# Patient Record
Sex: MALE | Race: WHITE | Employment: OTHER | URBAN - METROPOLITAN AREA
[De-identification: names, ages, dates, MRNs, and addresses within clinical notes are randomized per-mention and may not be internally consistent; named-entity substitution may affect disease eponyms.]

---

## 2022-08-23 ENCOUNTER — TELEPHONE (OUTPATIENT)
Dept: CARDIOLOGY CLINIC | Age: 82
End: 2022-08-23

## 2022-08-23 NOTE — TELEPHONE ENCOUNTER
[facility]  Cardiovascular & Thoracic Surgery  Structural Heart Program  [facility] [address]  [id number] (z) [phone] (q) [phone]    Watchman Clinic Evaluation       Eddy Wyatt has need for anticoagulation and is considered a high risk for stroke, but has a relative contraindication for long term anticoagulation. Pt. Reports two strokes in 2000 and a mechanical fall in 4/22 resulting in ER evaluation. Most recently, pt. had a stroke 6/22 while on Eliquis with left sided weakness and subsequently uses a walker for ambulation. Eliquis is currently held but would be ok to restart if necessary, four months after the stroke, for the short-term 45-day period following watchman device implantation per. Dr. Laurie Roach and neurologist, William Leyva DO from [facility] (See Dr. Marita Flores note from 8/12/22 for further details regarding this conversation). Plan for watchman procedure at the end of October per Dr. Laurie Roach. Pt. Deemed unable to take long term oral anticoagulation due to history of History of bleeding and/or non-major bleeding, High propensity for bleeding based on high-risk HAS-BLED score , and High propensity for bleeding based on history of falls . Based on the patient's medical history of non-valvular atrial fibrillation, stroke risk, as shown with [id number] score of 6, and bleeding risk, as shown with HAS-BLED score of 4, a shared decision was made between the patient and Dr. Ness Velasco to consider pursuing closure of the left atrial appendage as a safe and effective alternative to long term oral anticoagulation.      Atrial Fibrillation CHADSVASC2 Score Stroke Risk:     [de-identified] y.o. > 76        +4    male Male     +0   CHF HX: No    + 0   HTN HX: Yes    +1   Stroke/TIA/Thromboembolism Yes   +2   Vascular Disease HX: Yes    +1   Diabetes Mellitus No    + 0   CHADSVASC 2 Score 6      Annual Stroke Risk 9.7%- moderate-high                HAS-BLED Score:    HTN Hx [x] 1 Point   Renal Disease  [] 1 Point   Liver Disease [] [address] Hx [x] 1 Point   Prior Major Bleeding or Predisposition [x] 1 Point   Labile INR [] 1 Point   Age > 72 Years Current: [de-identified] y.o. [x] 1 Point   Rx Usage Predisposing to Bleeding [] 1 Point   Alcohol Use (> or = 8 Drinks/wk) [] 1 Point   Total: UCHASBLED: Score 4 High Risk 8.9% Risk of major bleeding 8.70  Bleeds Per 100 pts years Alternatives to Anticoagulation can be considered       Phone call planned for next week to discuss scheduling watchman procedure date in October, 4 months after stroke in June 2022. Thank you for the opportunity to participate in the care of your patient.       Ettie Simmonds, RN  08/23/22    [facility]  Structural Heart Nurse Coordinator

## 2022-09-01 ENCOUNTER — TELEPHONE (OUTPATIENT)
Dept: CARDIOLOGY CLINIC | Age: 82
End: 2022-09-01

## 2022-09-01 DIAGNOSIS — I48.0 PAROXYSMAL ATRIAL FIBRILLATION (HCC): Primary | ICD-10-CM

## 2022-09-01 NOTE — TELEPHONE ENCOUNTER
[facility]  Cardiovascular & Thoracic Surgery  Structural Heart Program  [facility] [address]  [address]   Y) [phone]  (L) [phone]     Master Pre-Procedure Instructions     Procedure: Master  Date: 10/24/22  Time: 10:30 AM   Provider: Derek Kennedy MD   Arrival Time: 09:30 AM   [facility] [address] [place] [address]     Before Your Procedure  ? If you develop flu-like symptoms (body aches, cough, fever, headache), cold, sore throat, flu, fever, infection,test positive for COVID-19 within 10 daysof your procedure, or wake up ill the morning of your procedure, please call [phone] and notify a Cardiac Procedural Nurse. ? Do not eat, drink, or chew gum after 12:00 AM (midnight) the night before your procedure. ? Your doctor may ask you to stop, change how you take, or start new medications before your procedure. Any pre-procedure medication changes will be reviewed during a pre-procedure phone call a week prior to your procedure. ? Please prepare to stay overnight in the hospital.   ? Please have your pre-procedure labs drawn at a [facility] facility on 10/20/22 (See the [facility] Lab information [facility] for lab locations and hours). ? Take a shower or bathe the night before or the morning of your procedure. You have additional testing to complete prior to your procedure. Please refer to lab locations below to have your labs completed 72 hours before your procedure. [facility] Laboratory Locations    Sites open Monday to Friday. ? indicates the location is open Saturdays. Call your preferred location for test preparation, business hours and other information you need. ? [facility] at [facility] [facility] [address]. [place] [address]    [phone]  Monday - Friday,  7:00 a.m. - 5:00 p.m.    Saturday,   No appointment needed  [facility] [address], [address]  [phone]  Monday-Friday, 6:30 a.m. - 6:00 p.m. No appointment needed     ? [facility] \[facility]\"" Outpatient Laboratory  [address], [address]  Monday - Friday, 7:00 a.m. - 6:00 p.m. Saturday, 9:00 a.m. - noon    [address]  [address] [facility] [address] [facility]  [phone]  Monday-Friday, 7:00 a.m. - 6:00  p.m. No appointment needed    ? [facility], [address] [facility] [address]  [phone]  Monday - Friday, 7:00 a.m. - 3:30 p.m. Saturday, 7:00 a.m. to noon  No appointment needed        The Day of Your Procedure   ? Please arrive at 09:30 AM (arrival time) on 10/24/22 (procedure date). You will report to the Heart & Vascular Center Entrance (off [address] [place] [address] Road [facility]) located in the back [facility] ([address], [address]). You will check in with the  (to the right of the sliding doors) and wait in the waiting area until you are greeted by a Cardiac Procedural Nurse and escorted back to the pre-procedural area. Call [phone] for directions. ? We make every effort to keep your scheduled time. In case of an unforeseen emergency, there is a chance your procedure could be delayed. We will inform you on arrival and /or as soon as a procedure delaying emergency occurs, if this is the case. Thank you for your patience and understanding. ? Bring an overnight bag and arrange for a friend or family member to drive you to and take you home from the hospital. Most patients stay in the hospital after their procedure and leave the hospital the next morning. ? The morning of your procedure, take your medications/pre-procedural medications as directed, with a sip of water. ? Patient and visitor parking is located directly in front of the [facility] entrance.    ? Wear loose, comfortable clothing appropriate for the procedure. ? Bring an updated medication list (or bring all medications in a clear plastic bag to review with Cardiac Procedural Nurse), copy of your Advance Directive (if you have one), insurance/prescription cards, and photo ID. Leave other valuables at home. ? No makeup, hair spray, perfume, jewelry, or contact lens. If you wear dentures or glasses, please bring a storage container so they can be safely removed prior to your procedure. ? If you are diabetic, please check and record your blood sugar prior to leaving for your procedure. ? Female patients may be asked for a urine specimen upon arrival, if under the age of 48years old. ? If you use a CPAP at night for sleep apnea, bring the CPAP machine with you to the hospital for your procedure and notify your doctor. ? You are allowed one visitor to be present with you in the pre-procedural area (This rule is subject to change based on national CDC guidelines and hospital regulations). No children under the age of 15 are allowed in the pre-procedural area. After Your Procedure   ? You will stay in the hospital for a minimum of one night. Thisis subject to change based on your unique needs and procedure, in efforts to keep you safe. ? After your procedure, you will return to the cardiac pre-procedure area. During this time, you will remain flat and your nurse will help to keep you safe and comfortable. After several hours, your nurse will get you something to eat and drink. Then, you will be transported to the Cardiac Intensive Care Unit ([phone]) or Cardiovascular Stepdown Unit ([phone]) where you will rest and spend the night. ? At discharge, the nurse will review your instructions, medications, follow-up appointments with you.  ? You may resume normal activities 48 hours after discharge BUT do not lift more than 10 lbs and avoid vigorous physical activityfor 7 days.   ? You may shower 24 hours after discharge BUT avoid hot tubs, saunas, pools, and bathsfor 7 days. Keep the site covered with a clean bandage/band aid and avoid lotion, Vaseline, or scrubbing. ? You will need to take antibiotics for the first 6 months after your procedure if you have dental work or procedures. ? Once you return home, call 911 if you experience: shortness of breathnot relieved by rest, chest pain, fever > 101 º F, and swelling/bleeding at your site. ? If you take metformin, you may resume this medication 48 hours after your discharge. If you are prescribed a blood thinning medication, it is important that you take this medication every day as prescribed. Call your Structural Heart Team with any questions [phone] (8:30AM-4:30PM M-F).

## 2022-10-19 ENCOUNTER — TRANSCRIBE ORDER (OUTPATIENT)
Dept: REGISTRATION | Age: 82
End: 2022-10-19

## 2022-10-19 ENCOUNTER — HOSPITAL ENCOUNTER (OUTPATIENT)
Dept: PREADMISSION TESTING | Age: 82
Discharge: HOME | End: 2022-10-19
Payer: MEDICARE

## 2022-10-19 DIAGNOSIS — I48.0 PAROXYSMAL ATRIAL FIBRILLATION (HCC): ICD-10-CM

## 2022-10-19 DIAGNOSIS — I48.0 PAROXYSMAL ATRIAL FIBRILLATION (HCC): Primary | ICD-10-CM

## 2022-10-19 PROBLEM — K21.9 GASTROESOPHAGEAL REFLUX DISEASE WITHOUT ESOPHAGITIS: Status: ACTIVE | Noted: 2022-10-19

## 2022-10-19 PROBLEM — E78.2 MIXED HYPERLIPIDEMIA: Status: ACTIVE | Noted: 2022-10-19

## 2022-10-19 PROBLEM — R56.9 SEIZURES (HCC): Status: ACTIVE | Noted: 2022-10-19

## 2022-10-19 PROBLEM — I49.5 SICK SINUS SYNDROME (HCC): Status: ACTIVE | Noted: 2022-10-19

## 2022-10-19 PROBLEM — I61.0 NONTRAUMATIC SUBCORTICAL HEMORRHAGE OF RIGHT CEREBRAL HEMISPHERE (HCC): Status: ACTIVE | Noted: 2022-10-19

## 2022-10-19 PROBLEM — S22.060A COMPRESSION FRACTURE OF T7 VERTEBRA (HCC): Status: ACTIVE | Noted: 2021-11-15

## 2022-10-19 PROBLEM — Z96.89 STATUS POST DEEP BRAIN STIMULATOR PLACEMENT: Status: ACTIVE | Noted: 2022-10-19

## 2022-10-19 PROBLEM — I25.10 ASCVD (ARTERIOSCLEROTIC CARDIOVASCULAR DISEASE): Status: ACTIVE | Noted: 2022-10-19

## 2022-10-19 PROBLEM — I10 BENIGN ESSENTIAL HTN: Status: ACTIVE | Noted: 2022-10-19

## 2022-10-19 PROBLEM — G90.3 NEUROGENIC ORTHOSTATIC HYPOTENSION (HCC): Status: ACTIVE | Noted: 2022-10-19

## 2022-10-19 PROBLEM — G20 PARKINSONISM (HCC): Status: ACTIVE | Noted: 2022-10-19

## 2022-10-19 PROBLEM — Z95.9 UNSPECIFIED CARDIAC DEVICE IN SITU: Status: ACTIVE | Noted: 2022-10-19

## 2022-10-19 PROBLEM — Z86.73 HISTORY OF CVA (CEREBROVASCULAR ACCIDENT): Status: ACTIVE | Noted: 2022-10-19

## 2022-10-19 PROBLEM — I44.7 LBBB (LEFT BUNDLE BRANCH BLOCK): Status: ACTIVE | Noted: 2022-10-19

## 2022-10-19 PROBLEM — R25.9 MIXED ACTION AND RESTING TREMOR: Status: ACTIVE | Noted: 2021-08-03

## 2022-10-19 PROBLEM — N18.9 CKD (CHRONIC KIDNEY DISEASE): Status: ACTIVE | Noted: 2022-10-19

## 2022-10-19 LAB
ABO + RH BLD: NORMAL
ANION GAP SERPL CALC-SCNC: 5 MMOL/L (ref 3–18)
ATRIAL RATE: 66 BPM
BLOOD GROUP ANTIBODIES SERPL: NORMAL
BUN SERPL-MCNC: 23 MG/DL (ref 7–18)
BUN/CREAT SERPL: 15 (ref 12–20)
CALCIUM SERPL-MCNC: 8.3 MG/DL (ref 8.5–10.1)
CALCULATED R AXIS, ECG10: 12 DEGREES
CALCULATED T AXIS, ECG11: 77 DEGREES
CHLORIDE SERPL-SCNC: 106 MMOL/L (ref 100–111)
CO2 SERPL-SCNC: 29 MMOL/L (ref 21–32)
CREAT SERPL-MCNC: 1.53 MG/DL (ref 0.6–1.3)
DIAGNOSIS, 93000: NORMAL
ERYTHROCYTE [DISTWIDTH] IN BLOOD BY AUTOMATED COUNT: 13.2 % (ref 11.6–14.5)
GLUCOSE SERPL-MCNC: 140 MG/DL (ref 74–99)
HCT VFR BLD AUTO: 36 % (ref 36–48)
HGB BLD-MCNC: 12 G/DL (ref 13–16)
INR PPP: 1.1 (ref 0.8–1.2)
MCH RBC QN AUTO: 29.1 PG (ref 24–34)
MCHC RBC AUTO-ENTMCNC: 33.3 G/DL (ref 31–37)
MCV RBC AUTO: 87.4 FL (ref 78–100)
NRBC # BLD: 0 K/UL (ref 0–0.01)
NRBC BLD-RTO: 0 PER 100 WBC
P-R INTERVAL, ECG05: 280 MS
PLATELET # BLD AUTO: 195 K/UL (ref 135–420)
PMV BLD AUTO: 9.9 FL (ref 9.2–11.8)
POTASSIUM SERPL-SCNC: 4 MMOL/L (ref 3.5–5.5)
PROTHROMBIN TIME: 14.7 SEC (ref 11.5–15.2)
Q-T INTERVAL, ECG07: 446 MS
QRS DURATION, ECG06: 120 MS
QTC CALCULATION (BEZET), ECG08: 467 MS
RBC # BLD AUTO: 4.12 M/UL (ref 4.35–5.65)
SODIUM SERPL-SCNC: 140 MMOL/L (ref 136–145)
SPECIMEN EXP DATE BLD: NORMAL
VENTRICULAR RATE, ECG03: 66 BPM
WBC # BLD AUTO: 7.2 K/UL (ref 4.6–13.2)

## 2022-10-19 PROCEDURE — 36415 COLL VENOUS BLD VENIPUNCTURE: CPT

## 2022-10-19 PROCEDURE — 80048 BASIC METABOLIC PNL TOTAL CA: CPT

## 2022-10-19 PROCEDURE — 85610 PROTHROMBIN TIME: CPT

## 2022-10-19 PROCEDURE — 86900 BLOOD TYPING SEROLOGIC ABO: CPT

## 2022-10-19 PROCEDURE — 93005 ELECTROCARDIOGRAM TRACING: CPT

## 2022-10-19 PROCEDURE — 85027 COMPLETE CBC AUTOMATED: CPT

## 2022-10-24 ENCOUNTER — HOSPITAL ENCOUNTER (INPATIENT)
Age: 82
LOS: 1 days | Discharge: HOME | DRG: 274 | End: 2022-10-24
Attending: INTERNAL MEDICINE | Admitting: INTERNAL MEDICINE
Payer: MEDICARE

## 2022-10-24 ENCOUNTER — APPOINTMENT (OUTPATIENT)
Dept: GENERAL RADIOLOGY | Age: 82
DRG: 274 | End: 2022-10-24
Attending: INTERNAL MEDICINE
Payer: MEDICARE

## 2022-10-24 ENCOUNTER — ANESTHESIA (OUTPATIENT)
Dept: CARDIAC CATH/INVASIVE PROCEDURES | Age: 82
DRG: 274 | End: 2022-10-24
Payer: MEDICARE

## 2022-10-24 ENCOUNTER — TRANSCRIBE ORDER (OUTPATIENT)
Dept: CARDIAC CATH/INVASIVE PROCEDURES | Age: 82
End: 2022-10-24

## 2022-10-24 ENCOUNTER — ANESTHESIA EVENT (OUTPATIENT)
Dept: CARDIAC CATH/INVASIVE PROCEDURES | Age: 82
DRG: 274 | End: 2022-10-24
Payer: MEDICARE

## 2022-10-24 VITALS
OXYGEN SATURATION: 93 % | DIASTOLIC BLOOD PRESSURE: 94 MMHG | WEIGHT: 145 LBS | RESPIRATION RATE: 14 BRPM | HEART RATE: 72 BPM | SYSTOLIC BLOOD PRESSURE: 127 MMHG | BODY MASS INDEX: 20.81 KG/M2

## 2022-10-24 DIAGNOSIS — I48.0 PAROXYSMAL ATRIAL FIBRILLATION (HCC): ICD-10-CM

## 2022-10-24 DIAGNOSIS — Z95.818 PRESENCE OF WATCHMAN LEFT ATRIAL APPENDAGE CLOSURE DEVICE: Primary | ICD-10-CM

## 2022-10-24 DIAGNOSIS — I48.0 PAROXYSMAL ATRIAL FIBRILLATION (HCC): Primary | ICD-10-CM

## 2022-10-24 PROBLEM — I48.91 ATRIAL FIBRILLATION (HCC): Status: ACTIVE | Noted: 2022-10-24

## 2022-10-24 LAB
ACT BLD: 190 SECS (ref 79–138)
ACT BLD: 207 SECS (ref 79–138)
ANION GAP SERPL CALC-SCNC: 5 MMOL/L (ref 3–18)
ATRIAL RATE: 74 BPM
BASOPHILS # BLD: 0 K/UL (ref 0–0.1)
BASOPHILS NFR BLD: 1 % (ref 0–2)
BUN SERPL-MCNC: 19 MG/DL (ref 7–18)
BUN/CREAT SERPL: 14 (ref 12–20)
CALCIUM SERPL-MCNC: 8.7 MG/DL (ref 8.5–10.1)
CALCULATED P AXIS, ECG09: 80 DEGREES
CALCULATED R AXIS, ECG10: -18 DEGREES
CALCULATED T AXIS, ECG11: 27 DEGREES
CHLORIDE SERPL-SCNC: 106 MMOL/L (ref 100–111)
CO2 SERPL-SCNC: 28 MMOL/L (ref 21–32)
CREAT SERPL-MCNC: 1.33 MG/DL (ref 0.6–1.3)
DIAGNOSIS, 93000: NORMAL
DIFFERENTIAL METHOD BLD: ABNORMAL
EOSINOPHIL # BLD: 0.8 K/UL (ref 0–0.4)
EOSINOPHIL NFR BLD: 10 % (ref 0–5)
ERYTHROCYTE [DISTWIDTH] IN BLOOD BY AUTOMATED COUNT: 12.9 % (ref 11.6–14.5)
GLUCOSE SERPL-MCNC: 122 MG/DL (ref 74–99)
HCT VFR BLD AUTO: 34.5 % (ref 36–48)
HGB BLD-MCNC: 11.8 G/DL (ref 13–16)
IMM GRANULOCYTES # BLD AUTO: 0 K/UL (ref 0–0.04)
IMM GRANULOCYTES NFR BLD AUTO: 0 % (ref 0–0.5)
INR BLD: 1.4 (ref 0.9–1.2)
LYMPHOCYTES # BLD: 1.1 K/UL (ref 0.9–3.6)
LYMPHOCYTES NFR BLD: 14 % (ref 21–52)
MCH RBC QN AUTO: 29.8 PG (ref 24–34)
MCHC RBC AUTO-ENTMCNC: 34.2 G/DL (ref 31–37)
MCV RBC AUTO: 87.1 FL (ref 78–100)
MONOCYTES # BLD: 0.6 K/UL (ref 0.05–1.2)
MONOCYTES NFR BLD: 7 % (ref 3–10)
NEUTS SEG # BLD: 5.6 K/UL (ref 1.8–8)
NEUTS SEG NFR BLD: 69 % (ref 40–73)
NRBC # BLD: 0 K/UL (ref 0–0.01)
NRBC BLD-RTO: 0 PER 100 WBC
P-R INTERVAL, ECG05: 306 MS
PLATELET # BLD AUTO: 198 K/UL (ref 135–420)
PMV BLD AUTO: 9.4 FL (ref 9.2–11.8)
POTASSIUM SERPL-SCNC: 3.7 MMOL/L (ref 3.5–5.5)
Q-T INTERVAL, ECG07: 430 MS
QRS DURATION, ECG06: 106 MS
QTC CALCULATION (BEZET), ECG08: 477 MS
RBC # BLD AUTO: 3.96 M/UL (ref 4.35–5.65)
SODIUM SERPL-SCNC: 139 MMOL/L (ref 136–145)
VENTRICULAR RATE, ECG03: 74 BPM
WBC # BLD AUTO: 8.1 K/UL (ref 4.6–13.2)

## 2022-10-24 PROCEDURE — 93320 DOPPLER ECHO COMPLETE: CPT | Performed by: ANESTHESIOLOGY

## 2022-10-24 PROCEDURE — 80048 BASIC METABOLIC PNL TOTAL CA: CPT

## 2022-10-24 PROCEDURE — 85347 COAGULATION TIME ACTIVATED: CPT

## 2022-10-24 PROCEDURE — 99100 ANES PT EXTEME AGE<1 YR&>70: CPT | Performed by: ANESTHESIOLOGY

## 2022-10-24 PROCEDURE — 77030004558 HC CATH ANGI DX SUPR TORQ CARD -A: Performed by: INTERNAL MEDICINE

## 2022-10-24 PROCEDURE — C1889 IMPLANT/INSERT DEVICE, NOC: HCPCS | Performed by: INTERNAL MEDICINE

## 2022-10-24 PROCEDURE — B246ZZ4 ULTRASONOGRAPHY OF RIGHT AND LEFT HEART, TRANSESOPHAGEAL: ICD-10-PCS | Performed by: INTERNAL MEDICINE

## 2022-10-24 PROCEDURE — B2111ZZ FLUOROSCOPY OF MULTIPLE CORONARY ARTERIES USING LOW OSMOLAR CONTRAST: ICD-10-PCS | Performed by: INTERNAL MEDICINE

## 2022-10-24 PROCEDURE — 93005 ELECTROCARDIOGRAM TRACING: CPT

## 2022-10-24 PROCEDURE — 74011000258 HC RX REV CODE- 258: Performed by: ANESTHESIOLOGY

## 2022-10-24 PROCEDURE — 93312 ECHO TRANSESOPHAGEAL: CPT | Performed by: ANESTHESIOLOGY

## 2022-10-24 PROCEDURE — 76060000034 HC ANESTHESIA 1.5 TO 2 HR: Performed by: INTERNAL MEDICINE

## 2022-10-24 PROCEDURE — 74011000250 HC RX REV CODE- 250: Performed by: INTERNAL MEDICINE

## 2022-10-24 PROCEDURE — 85025 COMPLETE CBC W/AUTO DIFF WBC: CPT

## 2022-10-24 PROCEDURE — 74011000250 HC RX REV CODE- 250: Performed by: ANESTHESIOLOGY

## 2022-10-24 PROCEDURE — 77030013797 HC KT TRNSDUC PRSSR EDWD -A: Performed by: INTERNAL MEDICINE

## 2022-10-24 PROCEDURE — 01926 ANES IVNTL RAD ICR ICAR/AORT: CPT | Performed by: ANESTHESIOLOGY

## 2022-10-24 PROCEDURE — 71045 X-RAY EXAM CHEST 1 VIEW: CPT

## 2022-10-24 PROCEDURE — 74011000636 HC RX REV CODE- 636: Performed by: INTERNAL MEDICINE

## 2022-10-24 PROCEDURE — 76937 US GUIDE VASCULAR ACCESS: CPT | Performed by: INTERNAL MEDICINE

## 2022-10-24 PROCEDURE — C1892 INTRO/SHEATH,FIXED,PEEL-AWAY: HCPCS | Performed by: INTERNAL MEDICINE

## 2022-10-24 PROCEDURE — 93325 DOPPLER ECHO COLOR FLOW MAPG: CPT | Performed by: ANESTHESIOLOGY

## 2022-10-24 PROCEDURE — C1760 CLOSURE DEV, VASC: HCPCS | Performed by: INTERNAL MEDICINE

## 2022-10-24 PROCEDURE — C1894 INTRO/SHEATH, NON-LASER: HCPCS | Performed by: INTERNAL MEDICINE

## 2022-10-24 PROCEDURE — 74011250636 HC RX REV CODE- 250/636: Performed by: ANESTHESIOLOGY

## 2022-10-24 PROCEDURE — 74011250636 HC RX REV CODE- 250/636: Performed by: INTERNAL MEDICINE

## 2022-10-24 PROCEDURE — 33340 PERQ CLSR TCAT L ATR APNDGE: CPT | Performed by: INTERNAL MEDICINE

## 2022-10-24 PROCEDURE — 4A023N8 MEASUREMENT OF CARDIAC SAMPLING AND PRESSURE, BILATERAL, PERCUTANEOUS APPROACH: ICD-10-PCS | Performed by: INTERNAL MEDICINE

## 2022-10-24 PROCEDURE — 74011250636 HC RX REV CODE- 250/636

## 2022-10-24 PROCEDURE — 77030018729 HC ELECTRD DEFIB PAD CARD -B: Performed by: INTERNAL MEDICINE

## 2022-10-24 PROCEDURE — 77030008683 HC TU ET CUF COVD -A: Performed by: ANESTHESIOLOGY

## 2022-10-24 PROCEDURE — 65270000029 HC RM PRIVATE

## 2022-10-24 PROCEDURE — 02L73DK OCCLUSION OF LEFT ATRIAL APPENDAGE WITH INTRALUMINAL DEVICE, PERCUTANEOUS APPROACH: ICD-10-PCS | Performed by: INTERNAL MEDICINE

## 2022-10-24 PROCEDURE — 85347 COAGULATION TIME ACTIVATED: CPT | Performed by: INTERNAL MEDICINE

## 2022-10-24 PROCEDURE — 74011250637 HC RX REV CODE- 250/637

## 2022-10-24 DEVICE — LEFT ATRIAL APPENDAGE CLOSURE DEVICE WITH DELIVERY SYSTEM
Type: IMPLANTABLE DEVICE | Status: FUNCTIONAL
Brand: WATCHMAN FLX™

## 2022-10-24 RX ORDER — ALFUZOSIN HYDROCHLORIDE 10 MG/1
10 TABLET, EXTENDED RELEASE ORAL DAILY
COMMUNITY

## 2022-10-24 RX ORDER — FINASTERIDE 5 MG/1
5 TABLET, FILM COATED ORAL DAILY
Status: DISCONTINUED | OUTPATIENT
Start: 2022-10-25 | End: 2022-10-24 | Stop reason: HOSPADM

## 2022-10-24 RX ORDER — PANTOPRAZOLE SODIUM 40 MG/1
40 TABLET, DELAYED RELEASE ORAL
Status: DISCONTINUED | OUTPATIENT
Start: 2022-10-25 | End: 2022-10-24 | Stop reason: HOSPADM

## 2022-10-24 RX ORDER — GLYCOPYRROLATE 0.2 MG/ML
INJECTION INTRAMUSCULAR; INTRAVENOUS AS NEEDED
Status: DISCONTINUED | OUTPATIENT
Start: 2022-10-24 | End: 2022-10-24 | Stop reason: HOSPADM

## 2022-10-24 RX ORDER — OXYCODONE AND ACETAMINOPHEN 5; 325 MG/1; MG/1
1 TABLET ORAL
Status: DISCONTINUED | OUTPATIENT
Start: 2022-10-24 | End: 2022-10-24 | Stop reason: HOSPADM

## 2022-10-24 RX ORDER — SUCCINYLCHOLINE CHLORIDE 20 MG/ML
INJECTION INTRAMUSCULAR; INTRAVENOUS AS NEEDED
Status: DISCONTINUED | OUTPATIENT
Start: 2022-10-24 | End: 2022-10-24 | Stop reason: HOSPADM

## 2022-10-24 RX ORDER — ONDANSETRON 2 MG/ML
4 INJECTION INTRAMUSCULAR; INTRAVENOUS ONCE
Status: COMPLETED | OUTPATIENT
Start: 2022-10-24 | End: 2022-10-24

## 2022-10-24 RX ORDER — DEXAMETHASONE SODIUM PHOSPHATE 4 MG/ML
INJECTION, SOLUTION INTRA-ARTICULAR; INTRALESIONAL; INTRAMUSCULAR; INTRAVENOUS; SOFT TISSUE AS NEEDED
Status: DISCONTINUED | OUTPATIENT
Start: 2022-10-24 | End: 2022-10-24 | Stop reason: HOSPADM

## 2022-10-24 RX ORDER — ALFUZOSIN HYDROCHLORIDE 10 MG/1
10 TABLET, EXTENDED RELEASE ORAL DAILY
Status: DISCONTINUED | OUTPATIENT
Start: 2022-10-25 | End: 2022-10-24 | Stop reason: HOSPADM

## 2022-10-24 RX ORDER — PRIMIDONE 50 MG/1
TABLET ORAL DAILY
COMMUNITY

## 2022-10-24 RX ORDER — OXYBUTYNIN CHLORIDE 5 MG/1
10 TABLET ORAL
Status: DISCONTINUED | OUTPATIENT
Start: 2022-10-24 | End: 2022-10-24 | Stop reason: HOSPADM

## 2022-10-24 RX ORDER — NEOSTIGMINE METHYLSULFATE 1 MG/ML
INJECTION, SOLUTION INTRAVENOUS AS NEEDED
Status: DISCONTINUED | OUTPATIENT
Start: 2022-10-24 | End: 2022-10-24 | Stop reason: HOSPADM

## 2022-10-24 RX ORDER — ONDANSETRON 4 MG/1
4 TABLET, ORALLY DISINTEGRATING ORAL
Status: DISCONTINUED | OUTPATIENT
Start: 2022-10-24 | End: 2022-10-24 | Stop reason: HOSPADM

## 2022-10-24 RX ORDER — ONDANSETRON 2 MG/ML
4 INJECTION INTRAMUSCULAR; INTRAVENOUS
Status: DISCONTINUED | OUTPATIENT
Start: 2022-10-24 | End: 2022-10-24 | Stop reason: HOSPADM

## 2022-10-24 RX ORDER — PROPOFOL 10 MG/ML
INJECTION, EMULSION INTRAVENOUS AS NEEDED
Status: DISCONTINUED | OUTPATIENT
Start: 2022-10-24 | End: 2022-10-24 | Stop reason: HOSPADM

## 2022-10-24 RX ORDER — ROCURONIUM BROMIDE 10 MG/ML
INJECTION, SOLUTION INTRAVENOUS AS NEEDED
Status: DISCONTINUED | OUTPATIENT
Start: 2022-10-24 | End: 2022-10-24 | Stop reason: HOSPADM

## 2022-10-24 RX ORDER — LISINOPRIL 10 MG/1
20 TABLET ORAL DAILY
Status: DISCONTINUED | OUTPATIENT
Start: 2022-10-25 | End: 2022-10-24 | Stop reason: HOSPADM

## 2022-10-24 RX ORDER — ATORVASTATIN CALCIUM 20 MG/1
20 TABLET, FILM COATED ORAL DAILY
COMMUNITY

## 2022-10-24 RX ORDER — LAMOTRIGINE 25 MG/1
25 TABLET ORAL DAILY
Status: DISCONTINUED | OUTPATIENT
Start: 2022-10-25 | End: 2022-10-24 | Stop reason: HOSPADM

## 2022-10-24 RX ORDER — FLUOXETINE HYDROCHLORIDE 20 MG/1
40 CAPSULE ORAL DAILY
Status: DISCONTINUED | OUTPATIENT
Start: 2022-10-25 | End: 2022-10-24 | Stop reason: HOSPADM

## 2022-10-24 RX ORDER — SODIUM CHLORIDE 9 MG/ML
400 INJECTION, SOLUTION INTRAVENOUS ONCE
Status: COMPLETED | OUTPATIENT
Start: 2022-10-24 | End: 2022-10-24

## 2022-10-24 RX ORDER — CARVEDILOL 25 MG/1
25 TABLET ORAL 2 TIMES DAILY WITH MEALS
COMMUNITY

## 2022-10-24 RX ORDER — HEPARIN SODIUM 200 [USP'U]/100ML
INJECTION, SOLUTION INTRAVENOUS
Status: COMPLETED | OUTPATIENT
Start: 2022-10-24 | End: 2022-10-24

## 2022-10-24 RX ORDER — AMLODIPINE BESYLATE 5 MG/1
2.5 TABLET ORAL DAILY
Status: DISCONTINUED | OUTPATIENT
Start: 2022-10-25 | End: 2022-10-24 | Stop reason: HOSPADM

## 2022-10-24 RX ORDER — HEPARIN SODIUM 1000 [USP'U]/ML
INJECTION, SOLUTION INTRAVENOUS; SUBCUTANEOUS AS NEEDED
Status: DISCONTINUED | OUTPATIENT
Start: 2022-10-24 | End: 2022-10-24 | Stop reason: HOSPADM

## 2022-10-24 RX ORDER — CEFAZOLIN SODIUM 1 G/3ML
INJECTION, POWDER, FOR SOLUTION INTRAMUSCULAR; INTRAVENOUS AS NEEDED
Status: DISCONTINUED | OUTPATIENT
Start: 2022-10-24 | End: 2022-10-24 | Stop reason: HOSPADM

## 2022-10-24 RX ORDER — ACETAMINOPHEN 325 MG/1
650 TABLET ORAL
Status: DISCONTINUED | OUTPATIENT
Start: 2022-10-24 | End: 2022-10-24 | Stop reason: HOSPADM

## 2022-10-24 RX ORDER — METHOCARBAMOL 500 MG/1
500 TABLET, FILM COATED ORAL
Status: DISCONTINUED | OUTPATIENT
Start: 2022-10-24 | End: 2022-10-24 | Stop reason: HOSPADM

## 2022-10-24 RX ORDER — LIDOCAINE HYDROCHLORIDE 10 MG/ML
INJECTION, SOLUTION EPIDURAL; INFILTRATION; INTRACAUDAL; PERINEURAL AS NEEDED
Status: DISCONTINUED | OUTPATIENT
Start: 2022-10-24 | End: 2022-10-24 | Stop reason: HOSPADM

## 2022-10-24 RX ORDER — FLUOXETINE HYDROCHLORIDE 40 MG/1
40 CAPSULE ORAL DAILY
COMMUNITY

## 2022-10-24 RX ORDER — CARVEDILOL 12.5 MG/1
25 TABLET ORAL 2 TIMES DAILY WITH MEALS
Status: DISCONTINUED | OUTPATIENT
Start: 2022-10-24 | End: 2022-10-24 | Stop reason: HOSPADM

## 2022-10-24 RX ORDER — FENTANYL CITRATE 50 UG/ML
INJECTION, SOLUTION INTRAMUSCULAR; INTRAVENOUS AS NEEDED
Status: DISCONTINUED | OUTPATIENT
Start: 2022-10-24 | End: 2022-10-24 | Stop reason: HOSPADM

## 2022-10-24 RX ORDER — ONDANSETRON 2 MG/ML
INJECTION INTRAMUSCULAR; INTRAVENOUS
Status: COMPLETED
Start: 2022-10-24 | End: 2022-10-24

## 2022-10-24 RX ORDER — ATORVASTATIN CALCIUM 20 MG/1
20 TABLET, FILM COATED ORAL DAILY
Status: DISCONTINUED | OUTPATIENT
Start: 2022-10-25 | End: 2022-10-24 | Stop reason: HOSPADM

## 2022-10-24 RX ADMIN — FENTANYL CITRATE 50 MCG: 50 INJECTION, SOLUTION INTRAMUSCULAR; INTRAVENOUS at 12:06

## 2022-10-24 RX ADMIN — FENTANYL CITRATE 25 MCG: 50 INJECTION, SOLUTION INTRAMUSCULAR; INTRAVENOUS at 13:04

## 2022-10-24 RX ADMIN — FENTANYL CITRATE 25 MCG: 50 INJECTION, SOLUTION INTRAMUSCULAR; INTRAVENOUS at 13:01

## 2022-10-24 RX ADMIN — CEFAZOLIN SODIUM 2 G: 1 INJECTION, POWDER, FOR SOLUTION INTRAMUSCULAR; INTRAVENOUS at 12:15

## 2022-10-24 RX ADMIN — PHENYLEPHRINE HYDROCHLORIDE 100 MCG: 10 INJECTION INTRAVENOUS at 12:16

## 2022-10-24 RX ADMIN — SODIUM CHLORIDE: 9 INJECTION, SOLUTION INTRAVENOUS at 11:59

## 2022-10-24 RX ADMIN — SUCCINYLCHOLINE CHLORIDE 100 MG: 20 INJECTION, SOLUTION INTRAMUSCULAR; INTRAVENOUS at 12:06

## 2022-10-24 RX ADMIN — APIXABAN 5 MG: 5 TABLET, FILM COATED ORAL at 17:14

## 2022-10-24 RX ADMIN — PHENYLEPHRINE HYDROCHLORIDE 200 MCG: 10 INJECTION INTRAVENOUS at 12:50

## 2022-10-24 RX ADMIN — PROPOFOL 150 MG: 10 INJECTION, EMULSION INTRAVENOUS at 12:06

## 2022-10-24 RX ADMIN — GLYCOPYRROLATE 0.4 MG: 0.2 INJECTION, SOLUTION INTRAMUSCULAR; INTRAVENOUS at 13:22

## 2022-10-24 RX ADMIN — HEPARIN SODIUM 3000 UNITS: 1000 INJECTION, SOLUTION INTRAVENOUS; SUBCUTANEOUS at 13:06

## 2022-10-24 RX ADMIN — ONDANSETRON 4 MG: 2 INJECTION INTRAMUSCULAR; INTRAVENOUS at 11:00

## 2022-10-24 RX ADMIN — PHENYLEPHRINE HYDROCHLORIDE 200 MCG: 10 INJECTION INTRAVENOUS at 13:08

## 2022-10-24 RX ADMIN — ROCURONIUM BROMIDE 20 MG: 50 INJECTION INTRAVENOUS at 12:10

## 2022-10-24 RX ADMIN — HEPARIN SODIUM 8000 UNITS: 1000 INJECTION, SOLUTION INTRAVENOUS; SUBCUTANEOUS at 12:36

## 2022-10-24 RX ADMIN — PHENYLEPHRINE HYDROCHLORIDE 200 MCG: 10 INJECTION INTRAVENOUS at 13:17

## 2022-10-24 RX ADMIN — NEOSTIGMINE METHYLSULFATE 3 MG: 1 INJECTION, SOLUTION INTRAVENOUS at 13:22

## 2022-10-24 RX ADMIN — PHENYLEPHRINE HYDROCHLORIDE 200 MCG: 10 INJECTION INTRAVENOUS at 12:31

## 2022-10-24 RX ADMIN — PHENYLEPHRINE HYDROCHLORIDE 200 MCG: 10 INJECTION INTRAVENOUS at 12:40

## 2022-10-24 RX ADMIN — DEXAMETHASONE SODIUM PHOSPHATE 4 MG: 4 INJECTION, SOLUTION INTRAMUSCULAR; INTRAVENOUS at 12:18

## 2022-10-24 RX ADMIN — PHENYLEPHRINE HYDROCHLORIDE 200 MCG: 10 INJECTION INTRAVENOUS at 12:26

## 2022-10-24 NOTE — Clinical Note
TRANSFER - IN REPORT:     [facility] . Report consisted of patient's Situation, Background, Assessment and   Recommendations(SBAR). Opportunity for questions and clarification was provided. Assessment completed upon patient's arrival to unit and care assumed. Patient transported with a Cardiac Cath Tech / Patient Care Tech.

## 2022-10-24 NOTE — ANESTHESIA PROCEDURE NOTES
EMILI  Date/Time: 10/24/2022 1:18 PM      Procedure Details: probe placement, image aquisition & interpretation    Site marked, Timeout performed, 13:18 EDT  Risks and benefits discussed with the patient and plans are to proceed    Procedure Note    Performed by: Luiz Vargas MD  Authorized by: Fátima Baird MD     Indications: assessment of surgical repair  Modalities: 2D, CF, CWD, PWD  Probe Type: multiplane  Insertion: atraumatic  Patient Status: intubated and sedated  Echocardiographic and Doppler Measurements   Aorta  Size  Diam(cm)  Dissection PlaqueThick(mm)  Plaque Mobile    Ascending normal  No 0-3 No    Arch normal  No 0-3 No    Descending normal  No 0-3 No          Valves  Annulus  Stenosis  Area/Grad  Regurg  Leaflet   Morph  Leaflet   Motion    Aortic dilated none  0 normal normal    Mitral normal none  0 normal normal    Tricuspid normal none  0 normal normal          Atria  Size  SEC (smoke)  Thrombus  Tumor  Device    Rt Atrium dilated No No No No    Lt Atrium normal No No No No     Interatrial Septum Morphology: lipomatous hypertrophy    Interventricular Septum Morphology: normal  Ventricle  Cavity Size  Cavity Dimension Hypertrophy  Thrombus  Gloal FXN  EF    RV dilated  No no normal     LV normal  Yes No normal 70%       Regional Function  (1 = normal, 2 = mildly hypokinetic, 3 = severely hypokinetic, 4 = akinetic, 5 = dyskinetic) LAV - [place]   ME LAV = 0  ME LAV = 90  ME LAV = 130                                     Pericardium: normal  Effusion: normal  Post Intervention Follow-up Study  Ventricular Global Function: unchanged  Ventricular Regional Function: unchanged     Valve  Function  Regurgitation  Area    Aortic no change      Mitral no change      Tricuspid no change      Prosthetic        Complications: None

## 2022-10-24 NOTE — Clinical Note
Device catheter removed. Delivery catheter removed, for other reason. Other reason device catheter removed: wrong size.

## 2022-10-24 NOTE — Clinical Note
A venogram was performed on the Other (document in comment). Injected with single hand injection. Injection volume  = 10 mL.  Left atrial appendage

## 2022-10-24 NOTE — ANESTHESIA POSTPROCEDURE EVALUATION
Procedure(s):  WATCHMAN BERYL CLOSURE DEVICE. general    Anesthesia Post Evaluation      Multimodal analgesia: multimodal analgesia used between 6 hours prior to anesthesia start to PACU discharge  Patient location during evaluation: PACU  Patient participation: complete - patient participated  Level of consciousness: lethargic  Pain score: 2  Airway patency: patent  Anesthetic complications: no  Cardiovascular status: acceptable  Respiratory status: acceptable  Hydration status: acceptable  Post anesthesia nausea and vomiting:  none  Final Post Anesthesia Temperature Assessment:  Normothermia (36.0-37.5 degrees C)      INITIAL Post-op Vital signs:   Vitals Value Taken Time   /89 10/24/22 1345   Temp     Pulse 63 10/24/22 1351   Resp 17 10/24/22 1351   SpO2 92 % 10/24/22 1351   Vitals shown include unvalidated device data.

## 2022-10-24 NOTE — PROCEDURES
Left Atrial Appendage Occlusion Device Implantation with Watchman Device and Cardiac Catheterization Procedure Note      Patient: Benjie Billy Age: [de-identified] y.o. Sex: male    YOB: 1940 Admit Date: 10/24/2022 PCP: Kelsey Moreno   MRN: [id number]  CSN: [id number]         DATE: 10/24/22  PHYSICIAN: Dr. Antionette Virk MD, [address] [facility]    POSTOPERATIVE DIAGNOSIS:   Non Valvular Atrial fibrillation  Patent Atrial Appendage    INDICATIONS:   Non Valvular atrial fibrillation  Falls and recurrent bleeding  CVA history    PROCEDURE PERFORMED:   1. Right femoral venous access  2. Right heart catheterization  3. Trans-septal puncture  4. EMILI interpretation  5. Left heart catheterization  6. LA appendage angiography  7. 24mm Watchman BERYL occluder implantation    DESCRIPTION OF PROCEDURE:   After informed consent where the procedure was discussed both during outpatient evaluation and again immediately prior to the procedure, the patient was brought to the cardiac catheterization suite. A shared decision form was completed by an independent non interventional cardiologist who is not part of the procedure and is attached in the chart. Patient was prepped and draped in usual and standard fashion. Please see associated anesthesia report. Patient jayce gastrically intubated with EMILI probe and images obtained with measurements made. Given acceptable measurements and patent BERYL occlusion procedure was continued. After right femoral venous access performed without difficulty using 18 gauge cook needle with 8Fr sheath placed in the right femoral vein, a Malia Liner wire was advanced into the SVC without difficulty. Heparin was initiated for anticoagulation with ACT maintained over 300 seconds throughout the procedure. Using the SL shape Daniel catheter and dilator the catheter was brought down under both fluorsoscopic and EMILI guidance noted to be in an acceptable inferior and posterior position.  Septum was crossed without difficulty using RF energy. With the protrace shaped wire in the LA, heparing was checked and ACT maintained over 300 seconds. Both RA and LA pressures were recorded and appeared acceptable. The double curve [employer] catheter was advanced without difficulty and used to cross the septum. A  pigtail catheter was advanced into the LA appendage and angiography performed in the ROSS CAU projection confirming measurements. A 24mm [employer] Watchman device was advanced into the LA appendage and deployed under both fluoroscopic and EMILI guidance. Repeat angiography and measurements made documenting excellent position, seal, compression and device stability. After PASS criteria confirmed, the device was released and remained stable. Final images showed excellent position and no evidence of pericardial effusion. All catheters and wires were removed and right femoral vein closed using figure of eight suture. Anticoagulation with Eliquis continued. PLEASE SEE ASSOCIATED ANESTHESIA REPORT    COMPLICATIONS: None    ESTIMATED BLOOD LOSS: <10cc      FINDINGS:    DIAGNOSTIC ANGIOGRAPHY FINDINGS:   Patent trabeculated bilobed LA appendage, occluded with 24mm Wathcman device with no noted leak angiographically and complete seal     EMILI: FINDINGS:  Well positioned 24mm BERYL occlusion device with compelte seal and no color doppler noted after device deployment. 16% compression. Trace pericardial effusion with no change at the end of the procedure. Normal LV function  With mild MR. LA PRESSURE: 21mmHg    PLAN and RECOMMENDATION:  Continue eliquis anticoagulation. Repeat EMILI 45 days.  Follow up 2 weeks with Dr Nj Eldridge MD  10/24/22

## 2022-10-24 NOTE — Clinical Note
Sheath #1: sheath exchanged for [facility] 8FR CHRIS L11CM DIL TIP 35MM SHANDA TUNGSTEN.

## 2022-10-24 NOTE — H&P
History and Physical Note      Patient: Maryellen Mckenna Age: [de-identified] y.o. Sex: male    YOB: 1940 Admit Date: 10/24/2022 PCP: Indiana Hernandez   MRN: [id number]  CSN: [id number]        H & P Note: The patient has been examined and I concur with the findings of the H&P. There are no significant changes. It is appropriate to proceed with the planned procedure. [de-identified] yo with paroxysmal AF broguht in for Watchman BERYL occluder as alternative to anticoagulation for stroke prevention. Has progressive parkinsons disease, past CVA and paroxysmal AF with HTN, CKD, LBBB and bradycardia with SSS that prompted PM placement. Review of Symptoms/Physical Examination (select if normal, otherwise findings as documented):  Head/Neck, Airway, Chest/Lungs, Heart, Abdomen, GI, , Extremities and Neurological    Visit Vitals  /79   Pulse 60   Resp 15   Wt 65.8 kg (145 lb)   SpO2 94%   BMI 20.81 kg/m²     Lungs clear. Nl s1 and s2 with systolic murmur. Abd soft with normal BS and non tender. Good pulses and neck supple. No edema. JVP flat. Disoriented this AM.    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 with dual chamber PM  CKD  LBBB  HTN  Parkinsons disease    Plan:  Watchman BERYL implantation    Informed Consent:  I have discussed the planned procedure and any reasonable alternative options, including the risks, benefits, potential complications and side effects associated with the planned procedure and alternatives (including the risks of not undergoing the procedure), potential problems that might occur during recuperation, and the likelihood of achieving goals, the name of any additional practitioners performing any other specified significant tasks during the procedure, and the plan for sedation or anesthesia with the patient and (if present) family. The patient was provided an opportunity to ask questions; all questions were answered.   After careful consideration, the patient wishes to proceed with the planned procedure.  ________________________________________________________________________    Indication(s) for Cath Lab Visit: Master CORDOBA occluder placement   ________________________________________________________________________    Pre-Sedation Assessment For Procedures Without An Anesthesia Provider  (must be completed within 48 hours prior to procedure)    Per anesthesia  ________________________________________________________________________    Immediate Pre-Sedation Assessment  (Complete immediately prior to procedural sedation if there is no anesthesia provider)    The patient was examined immediately prior to sedation and is deemed to be an appropriate candidate for the planned sedation.     Karla Stevens MD

## 2022-10-24 NOTE — Clinical Note
TRANSFER - OUT REPORT:     [facility] Marisela Burkitt. Report consisted of patient's Situation, Background, Assessment and   Recommendations(SBAR). Opportunity for questions and clarification was provided.

## 2022-10-24 NOTE — ANESTHESIA PREPROCEDURE EVALUATION
Relevant Problems   NEUROLOGY   (+) Seizures (HCC)      CARDIOVASCULAR   (+) Benign essential HTN   (+) LBBB (left bundle branch block)   (+) Paroxysmal atrial fibrillation (HCC)   (+) Sick sinus syndrome (HCC)      GASTROINTESTINAL   (+) Gastroesophageal reflux disease without esophagitis      RENAL FAILURE   (+) CKD (chronic kidney disease)       Anesthetic History   No history of anesthetic complications            Review of Systems / Medical History  Patient summary reviewed and pertinent labs reviewed    Pulmonary                Comments: Covid this past summer   Neuro/Psych     seizures  CVA  Neuromuscular disease and dementia    Comments: ICH  Parkinsons Cardiovascular    Hypertension        Dysrhythmias : atrial fibrillation  CAD and hyperlipidemia    Exercise tolerance: <4 METS  Comments: SSS  LBBB  POTS   GI/Hepatic/Renal     GERD    Renal disease: CRI       Endo/Other        Anemia     Other Findings            Physical Exam    Airway  Mallampati: III  TM Distance: > 6 cm  Neck ROM: normal range of motion   Mouth opening: Normal     Cardiovascular  Regular rate and rhythm,  S1 and S2 normal,  no murmur, click, rub, or gallop             Dental    Dentition: Edentulous     Pulmonary      Decreased breath sounds: bilateral           Abdominal  GI exam deferred       Other Findings            Anesthetic Plan    ASA: 4  Anesthesia type: general          Induction: Intravenous  Anesthetic plan and risks discussed with: Patient

## 2022-10-24 NOTE — DISCHARGE INSTRUCTIONS
Watchman Post-Procedure Hospital Discharge Instructions     Provider: Nesha Mcwilliams MD          Activity:  Avoid driving, operating machinery, alcohol consumption, signing legal documents or participating in legal proceedings for 24 hours after receiving sedation. Hospital Discharge: A responsible adult must drive you home from the hospital.  Kyler Keller may resume normal activities, including light walking, after 24 hours. Avoid lifting more than 10 pounds, jogging, exercise classes, sports and vigorous activities at least 7 days. Avoid sitting more than one consecutive hour for 3 days. If traveling, stop and walk for 5 minutes every hour. Medications:  Please take your blood thinner as directed: Eliquis 5 mg BID (one tablet in the morning and one tablet in the afternoon)  If you are experiencing any bleeding issues, please call your provider's office. Follow Up Visits After Watchman Procedure:  Visit 1: 1-2 weeks with implanting providers office or nurse practitioner. Visit 2: 45 days. At this visit, you will have a EMILI or CT scan to check on your device with possible follow up appointment with the nurse practitioner to review your medications and may make changes based on your EMILI or CT results. Visit 4: 6 months appointment with implanting provider or nurse practitioner. At this appointment, your doctor may make changes to your medications. You will continue aspirin for life. Visit 5: 1 year: CT/EMILI with follow up appointment with primary cardiologist.   Visit 6: 2 years follow up appointment with primary cardiologist.  Annual visits to follow with primary cardiologist.    Follow-up schedule after your Watchman procedure: You will have a follow up appointment with your provider in 1-2 weeks after procedure:   This is scheduled for 11/8/22 @ 3:30 PM with Dr. Nesha Mcwilliams: [facility], [phone], [address] [place] [address] Dr. Rozina Garcia [phone], [place], [address] [place] [address]  Around 45 Days: You will have a Transesophageal Echocardiogram (EMILI) at 12/18/22 at 09:15 AM [facility]. Please check in at the heart center entrance of the hospital at 08:00 AM and arrange for someone to drive you home after the EMILI. Remember, you cannot have anything to eat or drink after midnight the night before your EMILI. The doctor will give you directions about your blood thinning medication after the EMILI. Please continue medications unless specifically directed otherwise. If you are experiencing any bleeding issues, please call your provider's office. Around 6 months: You will have a follow up appointment with the implanting provider or nurse practitioner to discuss your blood thinning medications and any recommended changes to this. Dentist Visits: The first 6 months after the procedure you will need antibiotics before you have dental work. Take Amoxicillin 2000mg as directed 30mins to one hour prior to scheduled dental work. Please call your provider's office with any question or concerns. Potential Problems:   Please call us if you develop  A fever of 100 or higher during your first few days at home. Increased swelling of a groin bruise. A groin bruise is to be expected due to the blood thinners used during your procedure. Discoloration will spread as you move about but swelling should   decrease. Persistent throat and groin soreness/discomfort that does NOT resolve within a few days. Shortness of breath. .  A new persistent cough or unexplained shortness of breath. Decreased urination and weight gain. For questions and concerns, please call Dr. Dani Schwartz: [facility], [facility], [phone], [address] [phone], [place], [address] [place] [address]  For any emergencies, please seek medical attention by either calling 911 or going to nearest ED.

## 2022-10-24 NOTE — ROUTINE PROCESS
10:00  Cath holding summary     Patient escorted to cath holding from waiting area ambulatory, alert and oriented x 4, voicing no complaints. Changed into gown and placed on monitor. NPO since MN. Lab results, med rec and H&P reviewed on chart. PIV x 2 inserted without difficulty. Family to bedside. Patient endorsing nausea/vomiting. Dr. Marilou Aiken aware and 4mg zofran IV ordered and given. See MAR for details. 11:40  TRANSFER - OUT REPORT:    Verbal report given to Yessenia (name) on Millicent Zhong  being transferred to EP lab (unit) for ordered procedure     Report consisted of patients Situation, Background, Assessment and   Recommendations(SBAR). Information from the following report(s) SBAR, Intake/Output, MAR, and Pre Procedure Checklist was reviewed with the receiving nurse. Lines:   Peripheral IV 10/24/22 Anterior;Right Forearm (Active)   Site Assessment Clean, dry, & intact 10/24/22 1141   Phlebitis Assessment 0 10/24/22 1141   Dressing Status Clean, dry, & intact 10/24/22 1141   Dressing Type Transparent 10/24/22 1141   Hub Color/Line Status Flushed 10/24/22 1141       Peripheral IV 10/24/22 Anterior;Left;Proximal Forearm (Active)   Site Assessment Clean, dry, & intact 10/24/22 1141   Phlebitis Assessment 0 10/24/22 1141   Dressing Status Clean, dry, & intact 10/24/22 1141   Dressing Type Transparent 10/24/22 1141   Hub Color/Line Status Flushed 10/24/22 1141   Opportunity for questions and clarification was provided. Patient transported with:   Tech      1320  TRANSFER - IN REPORT:    Verbal report received from [address] [place] [address],[address] (name) on Millicent Zhong  being received from EP Lab (unit) for ordered procedure    Report consisted of patients Situation, Background, Assessment and   Recommendations(SBAR). Information from the following report(s) SBAR, Procedure Summary, Intake/Output, MAR, Recent Results, and Quality Measures was reviewed with the receiving nurse.   Opportunity for questions and clarification was provided. Assessment completed upon patients arrival to unit and care assumed. Perclose to right femoral vein. CDI. Placed patient on cardiac monitor. NAD and denies pain. Wife at bedside. [phone]  AVS Discharge instructions reviewed with patient and copy given to patient. All questions answered. Patient verbalized understanding to all discharge instructions. PIV removed. Procedural site within normal limits. No hematoma or bleeding noted from procedural and PIV site. No pain noted at discharge. Patient discharged with support person in stable condition. Escorted out to vehicle for transport home.

## 2022-10-24 NOTE — DISCHARGE SUMMARY
Cardiology Discharge Summary     Patient ID:  Inge Gonzalez  [id number]  18 y.o.  1940    Admit Date: 10/24/2022    Discharge Date: 10/24/2022     Admitting Physician: Rolando Roberson MD     Discharge Physician: Reynold Moeller MD     Admission Diagnoses:   Paroxysmal atrial fibrillation ([address] [I48.0]  Atrial fibrillation ([address] [I48.91]  AF (paroxysmal atrial fibrillation) ([address] [I48.0]    Discharge Condition: Stable    Cardiology Procedures this Admission:   Elective watchman procedure with implantation of 24mm FLX watchman device. Hospital Course:  S/p LAAC with watchman device today with no significant complaints. Right groin site stable without bleeding or hematoma. Routine post procedure care. Chest x-ray reviewed with no obvious pericardial effusion, device visualized and placement unchanged. Hemodynamically stable post procedure on evaluation. Consults: None    Visit Vitals  /82   Pulse 60   Resp 12   Wt 65.8 kg (145 lb)   SpO2 94%   BMI 20.81 kg/m²     Physical Exam  Abdomen: soft, non-tender. Bowel sounds normal. No masses,  no organomegaly  Extremities: extremities normal, atraumatic, no cyanosis or edema  Heart: regular rate and rhythm, S1, S2 normal, no murmur, click, rub or gallop, device pocket intact  Lungs: clear to auscultation bilaterally  Neck: supple, symmetrical, trachea midline, no adenopathy, thyroid: not enlarged, symmetric, no tenderness/mass/nodules, no carotid bruit and no JVD  Neurologic: Grossly normal  Pulses: 2+ and symmetrical    Labs:   Recent Labs     10/24/22  1030   WBC 8.1   HGB 11.8*   HCT 34.5*        Recent Labs     10/24/22  1035 10/24/22  1030   NA  --  139   K  --  3.7   CL  --  106   CO2  --  28   GLU  --  122*   BUN  --  19*   CREA  --  1.33*   CA  --  8.7   INR 1.4*  --      EKG: Sinus rhythm on bedside monitor   Cxray: no obvious pericardial effusion, device verified and placement unchanged.      Disposition: home    Patient Instructions: Current Discharge Medication List        CONTINUE these medications which have CHANGED    Details   !! apixaban (Eliquis) 5 mg tablet Take 1 Tablet by mouth every twelve (12) hours for 60 days. Qty: 60 Tablet, Refills: 1       !! - Potential duplicate medications found. Please discuss with provider. CONTINUE these medications which have NOT CHANGED    Details   alfuzosin SR (UROXATRAL) 10 mg SR tablet Take 10 mg by mouth daily. carvediloL (COREG) 25 mg tablet Take 25 mg by mouth two (2) times daily (with meals). atorvastatin (LIPITOR) 20 mg tablet Take 20 mg by mouth daily. primidone (MYSOLINE) 50 mg tablet Take  by mouth daily. FLUoxetine (PROzac) 40 mg capsule Take 40 mg by mouth daily. methocarbamoL (Robaxin) 500 mg tablet Take 1 Tablet by mouth four (4) times daily as needed for Muscle Spasm(s) or Pain. Qty: 20 Tablet, Refills: 0      amLODIPine (NORVASC) 2.5 mg tablet Take 2.5 mg by mouth daily. esomeprazole (NEXIUM) 40 mg capsule Take 40 mg by mouth daily. finasteride (PROSCAR) 5 mg tablet Take 5 mg by mouth daily. lamoTRIgine (LaMICtal) 25 mg tablet Take 25 mg by mouth daily. lisinopriL (PRINIVIL, ZESTRIL) 20 mg tablet Take 20 mg by mouth daily. oxybutynin (DITROPAN) 5 mg tablet Take 10 mg by mouth nightly. !! apixaban (ELIQUIS) 5 mg tablet Take 1 Tablet by mouth two (2) times a day. Qty: 60 Tablet, Refills: 2    Associated Diagnoses: Paroxysmal atrial fibrillation ([address]       ! ! - Potential duplicate medications found. Please discuss with provider. STOP taking these medications       metoprolol tartrate (LOPRESSOR) 50 mg tablet Comments:   Reason for Stopping:         simvastatin (ZOCOR) 40 mg tablet Comments:   Reason for Stopping:             Referenced discharge instructions provided by nursing for diet and activity. Will be started on Eiquis as long as groin site remains stable prior to discharge.  Plan to continue [address] [place] [address] uninterrupted until repeat EMILI performed around 45 days post procedure. Plan for discharge today with follow up with Dr. Juan Carlos Mcdaniel 2 weeks after discharge. Endocarditis ppx advised for a total of 6 months following watchman implantation. Discussed with the patient about discharge plan, follow-up appointments, cardiology recommendations. Patient understood and agreed with the plan. Patient understands risk and benefits of anticoagulation including risk of bleeding.

## 2022-10-25 LAB
ATRIAL RATE: 61 BPM
CALCULATED P AXIS, ECG09: 97 DEGREES
CALCULATED R AXIS, ECG10: -25 DEGREES
CALCULATED T AXIS, ECG11: 76 DEGREES
DIAGNOSIS, 93000: NORMAL
Q-T INTERVAL, ECG07: 450 MS
QRS DURATION, ECG06: 126 MS
QTC CALCULATION (BEZET), ECG08: 453 MS
VENTRICULAR RATE, ECG03: 61 BPM

## 2022-12-15 ENCOUNTER — DOCUMENTATION ONLY (OUTPATIENT)
Dept: CARDIOTHORACIC SURGERY | Age: 82
End: 2022-12-15

## 2022-12-15 NOTE — PROGRESS NOTES
[facility]  [address]  [address]   B) [phone] (R) [phone]      Pre-Procedure Instructions     Procedure: Transesophageal Echocardiogram (EMILI)  Date: 12/19/22       Heart Center Arrival Time: 08:00 AM  Provider: Mancel Mcardle, MD       Procedure Time: 09:15 AM  [facility] [address] [place] [address]     Before Your Procedure  ? If you develop flu-like symptoms (body aches, cough, fever, headache), cold,sore throat,flu, fever, infection,test positive for COVID-19 within 10 daysof your procedure, or wake up ill the morning of your procedure, please call [phone] and notify a Cardiac Procedural Nurse. ? Do not eat, drink, or chew gum after 12:00 AM (midnight) the night before your procedure. ? Your doctor may ask you to stop, change how you take, or start new medications before your procedure. ? Take a shower or bathe the night before or the morning of your procedure. The Day of Your Procedure   ? You will report to the Heart & Vascular Center Entrance (off [address] [place] [address]) located in the back [facility] ([address], [address]). You will check in with the  (to the right of the sliding doors) and wait in the waiting area until you are greeted by a Cardiac Procedural Nurse and escorted back to the pre-procedural area. Call [phone] for directions. ? We make every effort to keep your scheduled time. In case of an unforeseen emergency, there is a chance your procedure could be delayed. We will inform you on arrival and /or as soon as a procedure delaying emergency occurs, if this is the case. Thank you for your patience and understanding. ?  Please arrange for a friend or family member to drive you to and take you home from the hospital.   ? The morning of your procedure, take your medications/pre-procedural medications as directed, with a small sip of water. ? Patient and visitor parking is located directly in front of the [facility] entrance. ? Wear loose, comfortable clothing appropriate for the procedure. ? Bring an updated medication list (or bring all medications in a clear plastic bag to review with Cardiac Procedural Nurse), copy of your Advance Directive (if you have one), insurance/prescription cards, and photo ID. Leave other valuables at home. ? No makeup, hair spray, perfume, jewelry, or contact lens. If you wear dentures or glasses, please bring a storage container so they can be safely removed prior to your procedure.

## 2022-12-19 ENCOUNTER — HOSPITAL ENCOUNTER (OUTPATIENT)
Dept: NON INVASIVE DIAGNOSTICS | Age: 82
Setting detail: OUTPATIENT SURGERY
Discharge: HOME | End: 2022-12-19
Attending: INTERNAL MEDICINE | Admitting: INTERNAL MEDICINE
Payer: MEDICARE

## 2022-12-19 ENCOUNTER — ANESTHESIA (OUTPATIENT)
Dept: NON INVASIVE DIAGNOSTICS | Age: 82
End: 2022-12-19
Payer: MEDICARE

## 2022-12-19 ENCOUNTER — ANESTHESIA EVENT (OUTPATIENT)
Dept: NON INVASIVE DIAGNOSTICS | Age: 82
End: 2022-12-19
Payer: MEDICARE

## 2022-12-19 VITALS
RESPIRATION RATE: 18 BRPM | DIASTOLIC BLOOD PRESSURE: 91 MMHG | WEIGHT: 145 LBS | HEART RATE: 62 BPM | HEIGHT: 70 IN | BODY MASS INDEX: 20.76 KG/M2 | OXYGEN SATURATION: 98 % | SYSTOLIC BLOOD PRESSURE: 155 MMHG

## 2022-12-19 DIAGNOSIS — Z95.818 PRESENCE OF WATCHMAN LEFT ATRIAL APPENDAGE CLOSURE DEVICE: ICD-10-CM

## 2022-12-19 DIAGNOSIS — I48.0 PAROXYSMAL ATRIAL FIBRILLATION (HCC): ICD-10-CM

## 2022-12-19 LAB
ANION GAP SERPL CALC-SCNC: 3 MMOL/L (ref 3–18)
BUN SERPL-MCNC: 17 MG/DL (ref 7–18)
BUN/CREAT SERPL: 13 (ref 12–20)
CALCIUM SERPL-MCNC: 9 MG/DL (ref 8.5–10.1)
CHLORIDE SERPL-SCNC: 108 MMOL/L (ref 100–111)
CO2 SERPL-SCNC: 31 MMOL/L (ref 21–32)
CREAT SERPL-MCNC: 1.28 MG/DL (ref 0.6–1.3)
ERYTHROCYTE [DISTWIDTH] IN BLOOD BY AUTOMATED COUNT: 12.2 % (ref 11.6–14.5)
GLUCOSE SERPL-MCNC: 118 MG/DL (ref 74–99)
HCT VFR BLD AUTO: 35.2 % (ref 36–48)
HGB BLD-MCNC: 11.8 G/DL (ref 13–16)
MCH RBC QN AUTO: 29.2 PG (ref 24–34)
MCHC RBC AUTO-ENTMCNC: 33.5 G/DL (ref 31–37)
MCV RBC AUTO: 87.1 FL (ref 78–100)
NRBC # BLD: 0 K/UL (ref 0–0.01)
NRBC BLD-RTO: 0 PER 100 WBC
PLATELET # BLD AUTO: 175 K/UL (ref 135–420)
PMV BLD AUTO: 10 FL (ref 9.2–11.8)
POTASSIUM SERPL-SCNC: 4.1 MMOL/L (ref 3.5–5.5)
RBC # BLD AUTO: 4.04 M/UL (ref 4.35–5.65)
SODIUM SERPL-SCNC: 142 MMOL/L (ref 136–145)
WBC # BLD AUTO: 7.2 K/UL (ref 4.6–13.2)

## 2022-12-19 PROCEDURE — 99100 ANES PT EXTEME AGE<1 YR&>70: CPT | Performed by: ANESTHESIOLOGY

## 2022-12-19 PROCEDURE — 85027 COMPLETE CBC AUTOMATED: CPT

## 2022-12-19 PROCEDURE — 80048 BASIC METABOLIC PNL TOTAL CA: CPT

## 2022-12-19 PROCEDURE — 01922 ANES N-INVAS IMG/RADJ THER: CPT | Performed by: ANESTHESIOLOGY

## 2022-12-19 PROCEDURE — 74011250636 HC RX REV CODE- 250/636: Performed by: ANESTHESIOLOGY

## 2022-12-19 PROCEDURE — 93325 DOPPLER ECHO COLOR FLOW MAPG: CPT

## 2022-12-19 PROCEDURE — 76060000031 HC ANESTHESIA FIRST 0.5 HR

## 2022-12-19 RX ORDER — ASPIRIN 81 MG/1
81 TABLET ORAL DAILY
Qty: 90 TABLET | Refills: 3 | Status: SHIPPED | OUTPATIENT
Start: 2022-12-19

## 2022-12-19 RX ORDER — SODIUM CHLORIDE 9 MG/ML
INJECTION, SOLUTION INTRAVENOUS
Status: DISCONTINUED | OUTPATIENT
Start: 2022-12-19 | End: 2022-12-19 | Stop reason: HOSPADM

## 2022-12-19 RX ORDER — SODIUM CHLORIDE 0.9 % (FLUSH) 0.9 %
5-40 SYRINGE (ML) INJECTION AS NEEDED
Status: DISCONTINUED | OUTPATIENT
Start: 2022-12-19 | End: 2022-12-19 | Stop reason: HOSPADM

## 2022-12-19 RX ORDER — PROPOFOL 10 MG/ML
INJECTION, EMULSION INTRAVENOUS AS NEEDED
Status: DISCONTINUED | OUTPATIENT
Start: 2022-12-19 | End: 2022-12-19 | Stop reason: HOSPADM

## 2022-12-19 RX ORDER — CLOPIDOGREL BISULFATE 75 MG/1
75 TABLET ORAL DAILY
Qty: 90 TABLET | Refills: 1 | Status: SHIPPED | OUTPATIENT
Start: 2022-12-19

## 2022-12-19 RX ADMIN — PROPOFOL 60 MG: 10 INJECTION, EMULSION INTRAVENOUS at 11:02

## 2022-12-19 RX ADMIN — SODIUM CHLORIDE: 9 INJECTION, SOLUTION INTRAVENOUS at 10:58

## 2022-12-19 RX ADMIN — PROPOFOL 40 MG: 10 INJECTION, EMULSION INTRAVENOUS at 11:05

## 2022-12-19 NOTE — PROGRESS NOTES
Anesthesia name:  Stephanie Her     Anesthesia is present for case. Refer to anesthesia log for vitals.

## 2022-12-19 NOTE — DISCHARGE INSTRUCTIONS
[facility]  Structural Heart Program  [facility] [address]  [address]   (W) [phone]  (M) [phone]     45-Day Post-Watchman Implant Echocardiogram (EMILI) Discharge Instructions    Activity:  Avoid driving, operating machinery, alcohol consumption, signing legal documents or participating in legal proceedings for 12 -24 hours after receiving sedation. Hospital Discharge: A responsible adult must drive you home from the hospital.    Myriam Solders may resume normal activities, including light walking, after 24 hours. Medications:     [x] STOP Eliquis and START to take both Plavix (Clopidogrel) and Aspirin 81 mg. Fill your Plavix prescription at your usual pharmacy. 1st dose  Plavix and Aspirin administered in the  hospital before discharge    [] Yes  or    [x] No   If NO, you did NOT receive Plavix and Aspirin in the hospital:  Start Plavix and Aspirin on 12/20/22 (date). Follow-up:   Office Visit: Your appointment to see Dr. Christy Alvarado is scheduled for 12/29/22 (date), at 09:15 AM (time). Dental Procedures: The first 6 months after the procedure you will need antibiotics before you have dental work. Take Amoxicillin 2000mg as directed 30mins to one hour prior to scheduled dental work. Please call your provider's office with any question or concerns. Potential Problems: Please call us if you develop:    Persistent throat and groin soreness/discomfort that does NOT resolve within a few days. A new persistent cough or unexplained shortness of breath. Decreased urination and weight gain > 5 lbs in 1 week. For questions and concerns during daytime hours, please call structural heart office at [phone]. For any emergencies, please seek medical attention by either calling 911 or going to nearest ED.

## 2022-12-19 NOTE — PROCEDURES
Transesophageal Echo Note    Patient: Patricio Alvarez Age: 81 y.o. Sex: male    YOB: 1940 Admit Date: 12/19/2022 PCP: Сергей Estevez   MRN: [id number]  CSN: [id number]       Exam Date: December 19, 2022  Performing Physician: Andrew Howell MD    Procedure:  3D, 2D TRANSESOPHAGEAL ECHO, spectral Doppler analysis, color flow Doppler    PROCEDURES PERFORMED:  Trans esophageal echocardiogram    INDICATION FOR PROCEDURE:  Atrial fibrillation  Post Watchman    PROCEDURE NOTE:  After informed consent where both benefits and risks of the procedure were explained in detail to the patient where the patient demonstrated and understanding of the risks, indication, benefits and procedure with expectations reviewed, the patient was take to the procedure. The patient was prepped per usual protocol and sedated. Patient was orogastricly intubated with EMILI probe with images obtained in multiple projections and angulations without difficulty. At the conclusion of the procedure, probe was removed. Patient was hemodynamically stable throughout the entire procedure. SEDATION:  Per anesthesia    FINDINGS  Left ventricular function is normal with an ejection fraction estimated at 55 %  Left atrium size is normal  Intact atrial septum  Well positioned LAAO with no flow surrounding the device.   The aortic valve is trileaflet with no evidence of aortic stenosis or aortic insufficiency  The mitral valve is morphologically normal. There is trace mitral regurgitation  The tricuspid valve is morphologically normal. There is trace tricuspid regurgitation  There is no pericardial effusion  There is no thrombus    IMPRESSION  Normal left ventricular systolic function with an ejection fraction of 55%  There is no significant valvular disease  Well seated Watchman device with no flow surrounding the device      Andrew Howell MD  December 19, 2022  11:12 AM

## 2022-12-19 NOTE — ANESTHESIA POSTPROCEDURE EVALUATION
* No procedures listed *. MAC    Anesthesia Post Evaluation      Multimodal analgesia: multimodal analgesia used between 6 hours prior to anesthesia start to PACU discharge  Patient location during evaluation: PACU  Patient participation: complete - patient participated  Level of consciousness: awake and alert  Pain management: adequate  Airway patency: patent  Anesthetic complications: no  Cardiovascular status: acceptable and hemodynamically stable  Respiratory status: acceptable  Hydration status: acceptable  Post anesthesia nausea and vomiting:  controlled      INITIAL Post-op Vital signs: No vitals data found for the desired time range.

## 2022-12-19 NOTE — ANESTHESIA PREPROCEDURE EVALUATION
Anesthetic History   No history of anesthetic complications            Review of Systems / Medical History  Patient summary reviewed, nursing notes reviewed and pertinent labs reviewed    Pulmonary  Within defined limits                 Neuro/Psych       CVA (right weakness)       Cardiovascular    Hypertension          Pacemaker, CAD and hyperlipidemia      Comments: 11/2021 cath  non obstructive multivessel CAD    06/2022 ECHO  ejection fraction of 55 %     GI/Hepatic/Renal     GERD    Renal disease: CRI       Endo/Other  Within defined limits           Other Findings              Physical Exam    Airway  Mallampati: II  TM Distance: 4 - 6 cm  Neck ROM: normal range of motion   Mouth opening: Normal     Cardiovascular    Rhythm: regular           Dental    Dentition: Full lower dentures and Full upper dentures     Pulmonary  Breath sounds clear to auscultation               Abdominal  GI exam deferred       Other Findings            Anesthetic Plan    ASA: 3  Anesthesia type: MAC            Anesthetic plan and risks discussed with: Patient

## 2022-12-19 NOTE — ROUTINE PROCESS
Patient ambulated from waiting area without difficulty, placed on monitor 13. A&O, no c/o. ID, NPO status, allergies, home meds verified. PIV x1 inserted, blood sent to lab. Groin prep completed, consents ready for signature.

## 2022-12-20 NOTE — H&P
History and Physical          Interval Note      Patient: Chau Zheng Age: 81 y.o. Sex: male    YOB: 1940 Admit Date: 12/19/2022 PCP: Elsa Aguilar   MRN: [id number]  [facility]: [id number]        Interval Update Note: The H&P has been reviewed, the patient has been examined and I concur with the findings of the H&P. There are no significant changes. It is appropriate to proceed with the planned procedure. Briefly, 80 yo brought in for EMILI to assess prevdiously placed Wathcman device to allow for anticoaguylation cessation. Review of Symptoms/Physical Examination (select if normal, otherwise findings as documented):  Head/Neck, Airway, Chest/Lungs, Heart, Abdomen, GI, , Extremities and Neurological    Informed Consent:  I have discussed the planned procedure and any reasonable alternative options, including the risks, benefits, potential complications and side effects associated with the planned procedure and alternatives (including the risks of not undergoing the procedure), potential problems that might occur during recuperation, and the likelihood of achieving goals, the name of any additional practitioners performing any other specified significant tasks during the procedure, and the plan for sedation or anesthesia with the patient and (if present) family. The patient was provided an opportunity to ask questions; all questions were answered.   After careful consideration, the patient wishes to proceed with the planned procedure.  ________________________________________________________________________    Indication(s) for Cath Lab Visit: Tati Corral  ________________________________________________________________________    Pre-Sedation Assessment For Procedures Without An Anesthesia Provider  Per anesthesia  ________________________________________________________________________    Immediate Pre-Sedation Assessment  (Complete immediately prior to procedural sedation if there is no anesthesia provider)    The patient was examined immediately prior to sedation and is deemed to be an appropriate candidate for the planned sedation.     Que Hunter MD   December 19, 2022  8:37 PM

## 2023-06-27 PROBLEM — E46 PROTEIN-CALORIE MALNUTRITION (HCC): Status: ACTIVE | Noted: 2022-06-17

## 2023-06-27 PROBLEM — I48.3 TYPICAL ATRIAL FLUTTER (HCC): Status: ACTIVE | Noted: 2023-06-27

## 2023-06-27 PROBLEM — R13.11 DYSPHAGIA, ORAL PHASE: Status: ACTIVE | Noted: 2022-06-17

## 2023-06-27 PROBLEM — I10 BENIGN HYPERTENSION: Status: ACTIVE | Noted: 2022-06-14

## 2023-06-27 PROBLEM — J32.9 CHRONIC SINUSITIS: Status: ACTIVE | Noted: 2023-02-23

## 2023-06-27 PROBLEM — Z96.89 PRESENCE OF OTHER SPECIFIED FUNCTIONAL IMPLANTS: Status: ACTIVE | Noted: 2022-06-17

## 2023-06-27 PROBLEM — I48.20 CHRONIC ATRIAL FIBRILLATION, UNSPECIFIED (HCC): Status: ACTIVE | Noted: 2022-06-17

## 2023-06-27 PROBLEM — R47.1 DYSARTHRIA AND ANARTHRIA: Status: ACTIVE | Noted: 2022-06-17

## 2023-06-27 PROBLEM — A41.9 SEPSIS (HCC): Status: ACTIVE | Noted: 2023-05-25

## 2023-06-27 PROBLEM — G25.0 ESSENTIAL TREMOR: Status: ACTIVE | Noted: 2021-07-01

## 2023-06-27 PROBLEM — I61.8 OTHER NONTRAUMATIC INTRACEREBRAL HEMORRHAGE (HCC): Status: ACTIVE | Noted: 2022-06-17

## 2023-06-27 PROBLEM — R56.9 SEIZURE (HCC): Status: ACTIVE | Noted: 2021-08-03

## 2023-06-27 PROBLEM — G20 PARKINSON'S DISEASE (HCC): Status: ACTIVE | Noted: 2022-06-17

## 2023-06-27 PROBLEM — T85.190A: Status: ACTIVE | Noted: 2022-05-06

## 2023-06-27 PROBLEM — R73.03 PREDIABETES: Status: ACTIVE | Noted: 2022-06-17

## 2023-06-27 PROBLEM — I25.10 ATHEROSCLEROTIC HEART DISEASE OF NATIVE CORONARY ARTERY WITHOUT ANGINA PECTORIS: Status: ACTIVE | Noted: 2022-06-17

## 2023-06-27 PROBLEM — E78.5 HYPERLIPIDEMIA, UNSPECIFIED: Status: ACTIVE | Noted: 2022-06-17

## 2023-06-27 PROBLEM — R07.9 CHEST PAIN: Status: ACTIVE | Noted: 2021-10-25

## 2023-06-27 PROBLEM — M62.81 MUSCLE WEAKNESS (GENERALIZED): Status: ACTIVE | Noted: 2022-06-17

## 2023-06-27 PROBLEM — N40.1 BENIGN PROSTATIC HYPERPLASIA WITH LOWER URINARY TRACT SYMPTOMS: Status: ACTIVE | Noted: 2022-06-17

## 2023-06-27 PROBLEM — R42 VERTIGO: Status: ACTIVE | Noted: 2022-05-06

## 2023-06-27 PROBLEM — R41.841 COGNITIVE COMMUNICATION DEFICIT: Status: ACTIVE | Noted: 2022-06-17

## 2023-06-27 PROBLEM — H81.4 VERTIGO OF CENTRAL ORIGIN: Status: ACTIVE | Noted: 2022-06-17

## 2023-06-27 PROBLEM — R50.9 FEVER IN ADULT: Status: ACTIVE | Noted: 2023-05-25

## 2023-06-27 PROBLEM — I10 ESSENTIAL HYPERTENSION: Status: ACTIVE | Noted: 2022-06-17

## 2023-06-27 PROBLEM — N17.9 AKI (ACUTE KIDNEY INJURY) (HCC): Status: ACTIVE | Noted: 2023-04-18

## 2023-07-25 PROBLEM — N39.0 URINARY TRACT INFECTION: Status: ACTIVE | Noted: 2023-07-25

## 2023-07-29 PROBLEM — J95.811 PNEUMOTHORAX, IATROGENIC: Status: ACTIVE | Noted: 2023-07-29

## 2023-07-29 PROBLEM — J90 PLEURAL EFFUSION, BILATERAL: Status: ACTIVE | Noted: 2023-07-29

## (undated) DEVICE — LEFT ATRIAL APPENDAGE CLOSURE DEVICE WITH DELIVERY SYSTEM
Type: IMPLANTABLE DEVICE | Status: NON-FUNCTIONAL
Brand: WATCHMAN FLX™

## (undated) DEVICE — ELECTRODE ES AD CRD L15FT DISP FOR PT BELOW 30LB REM

## (undated) DEVICE — CATHETER DIAG AD 6FR L110CM 0.038IN COR POLYUR PGTL W/ 6

## (undated) DEVICE — 1 X VERSACROSS TRANSSEPTAL SHEATH (INCLUDING  1 X J-TIP GUIDEWIRE); 1 X VERSACROSS RF WIRE (INCLUDING 1 X CONNECTOR CABLE (SINGLE USE)); 1 X DISPERSIVE ELECTRODE: Brand: VERSACROSS ACCESS SOLUTION

## (undated) DEVICE — MICROPUNCTURE INTRODUCER SET SILHOUETTE TRANSITIONLESS WITH STAINLESS STEEL WIRE GUIDE: Brand: MICROPUNCTURE

## (undated) DEVICE — SET FLD ADMIN 3 W STPCOCK FIX FEM L BOR 1IN

## (undated) DEVICE — KIT MIC INTR PERC 4F 60CM SMTH -- VSI

## (undated) DEVICE — ACCESS SHEATH WITH DILATOR: Brand: WATCHMAN FXD CURVE™ ACCESS SYSTEM

## (undated) DEVICE — MEDI-TRACE CADENCE ADULT, DEFIBRILLATION ELECTRODE -RTS  (10 PR/PK) - PHYSIO-CONTROL: Brand: MEDI-TRACE CADENCE

## (undated) DEVICE — PRESSURE MONITORING SET: Brand: TRUWAVE

## (undated) DEVICE — PERCLOSE PROGLIDE™ SUTURE-MEDIATED CLOSURE SYSTEM: Brand: PERCLOSE PROGLIDE™

## (undated) DEVICE — INTRODUCER SHTH 8FR CANN L11CM DIL TIP 35MM BLU TUNGSTEN